# Patient Record
Sex: MALE | Race: WHITE | Employment: OTHER | URBAN - METROPOLITAN AREA
[De-identification: names, ages, dates, MRNs, and addresses within clinical notes are randomized per-mention and may not be internally consistent; named-entity substitution may affect disease eponyms.]

---

## 2020-07-17 ENCOUNTER — HOSPITAL ENCOUNTER (EMERGENCY)
Age: 60
Discharge: HOME | End: 2020-07-17
Attending: EMERGENCY MEDICINE
Payer: COMMERCIAL

## 2020-07-17 ENCOUNTER — APPOINTMENT (OUTPATIENT)
Dept: GENERAL RADIOLOGY | Age: 60
End: 2020-07-17
Payer: COMMERCIAL

## 2020-07-17 ENCOUNTER — APPOINTMENT (OUTPATIENT)
Dept: MRI IMAGING | Age: 60
End: 2020-07-17
Payer: COMMERCIAL

## 2020-07-17 VITALS
BODY MASS INDEX: 33.18 KG/M2 | HEIGHT: 72 IN | SYSTOLIC BLOOD PRESSURE: 135 MMHG | OXYGEN SATURATION: 96 % | DIASTOLIC BLOOD PRESSURE: 85 MMHG | HEART RATE: 72 BPM | TEMPERATURE: 97.9 F | RESPIRATION RATE: 18 BRPM | WEIGHT: 245 LBS

## 2020-07-17 LAB
ANION GAP SERPL CALCULATED.3IONS-SCNC: 11 MMOL/L (ref 3–16)
BASOPHILS ABSOLUTE: 0.1 K/UL (ref 0–0.2)
BASOPHILS RELATIVE PERCENT: 0.8 %
BUN BLDV-MCNC: 8 MG/DL (ref 7–20)
C-REACTIVE PROTEIN: 17.5 MG/L (ref 0–5.1)
CALCIUM SERPL-MCNC: 9.3 MG/DL (ref 8.3–10.6)
CHLORIDE BLD-SCNC: 103 MMOL/L (ref 99–110)
CO2: 24 MMOL/L (ref 21–32)
CREAT SERPL-MCNC: 0.8 MG/DL (ref 0.8–1.3)
EOSINOPHILS ABSOLUTE: 0.2 K/UL (ref 0–0.6)
EOSINOPHILS RELATIVE PERCENT: 2.9 %
GFR AFRICAN AMERICAN: >60
GFR NON-AFRICAN AMERICAN: >60
GLUCOSE BLD-MCNC: 128 MG/DL (ref 70–99)
HCT VFR BLD CALC: 47.1 % (ref 40.5–52.5)
HEMOGLOBIN: 15.9 G/DL (ref 13.5–17.5)
LYMPHOCYTES ABSOLUTE: 1.7 K/UL (ref 1–5.1)
LYMPHOCYTES RELATIVE PERCENT: 22.2 %
MCH RBC QN AUTO: 31.1 PG (ref 26–34)
MCHC RBC AUTO-ENTMCNC: 33.7 G/DL (ref 31–36)
MCV RBC AUTO: 92.3 FL (ref 80–100)
MONOCYTES ABSOLUTE: 0.8 K/UL (ref 0–1.3)
MONOCYTES RELATIVE PERCENT: 9.8 %
NEUTROPHILS ABSOLUTE: 5 K/UL (ref 1.7–7.7)
NEUTROPHILS RELATIVE PERCENT: 64.3 %
PDW BLD-RTO: 12.9 % (ref 12.4–15.4)
PLATELET # BLD: 174 K/UL (ref 135–450)
PMV BLD AUTO: 8.1 FL (ref 5–10.5)
POTASSIUM REFLEX MAGNESIUM: 3.8 MMOL/L (ref 3.5–5.1)
RBC # BLD: 5.1 M/UL (ref 4.2–5.9)
SEDIMENTATION RATE, ERYTHROCYTE: 18 MM/HR (ref 0–20)
SODIUM BLD-SCNC: 138 MMOL/L (ref 136–145)
WBC # BLD: 7.9 K/UL (ref 4–11)

## 2020-07-17 PROCEDURE — 86140 C-REACTIVE PROTEIN: CPT

## 2020-07-17 PROCEDURE — 99283 EMERGENCY DEPT VISIT LOW MDM: CPT

## 2020-07-17 PROCEDURE — 85652 RBC SED RATE AUTOMATED: CPT

## 2020-07-17 PROCEDURE — 6370000000 HC RX 637 (ALT 250 FOR IP): Performed by: PHYSICIAN ASSISTANT

## 2020-07-17 PROCEDURE — 96374 THER/PROPH/DIAG INJ IV PUSH: CPT

## 2020-07-17 PROCEDURE — 80048 BASIC METABOLIC PNL TOTAL CA: CPT

## 2020-07-17 PROCEDURE — 73721 MRI JNT OF LWR EXTRE W/O DYE: CPT

## 2020-07-17 PROCEDURE — 85025 COMPLETE CBC W/AUTO DIFF WBC: CPT

## 2020-07-17 PROCEDURE — 6360000002 HC RX W HCPCS: Performed by: PHYSICIAN ASSISTANT

## 2020-07-17 PROCEDURE — 73502 X-RAY EXAM HIP UNI 2-3 VIEWS: CPT

## 2020-07-17 RX ORDER — LIDOCAINE 4 G/G
1 PATCH TOPICAL DAILY
Status: DISCONTINUED | OUTPATIENT
Start: 2020-07-17 | End: 2020-07-17 | Stop reason: HOSPADM

## 2020-07-17 RX ORDER — KETOROLAC TROMETHAMINE 30 MG/ML
15 INJECTION, SOLUTION INTRAMUSCULAR; INTRAVENOUS ONCE
Status: COMPLETED | OUTPATIENT
Start: 2020-07-17 | End: 2020-07-17

## 2020-07-17 RX ORDER — NAPROXEN 375 MG/1
375 TABLET ORAL 2 TIMES DAILY WITH MEALS
Qty: 28 TABLET | Refills: 0 | Status: SHIPPED | OUTPATIENT
Start: 2020-07-17 | End: 2020-07-31

## 2020-07-17 RX ADMIN — KETOROLAC TROMETHAMINE 15 MG: 30 INJECTION, SOLUTION INTRAMUSCULAR at 12:01

## 2020-07-17 ASSESSMENT — PAIN SCALES - GENERAL
PAINLEVEL_OUTOF10: 2
PAINLEVEL_OUTOF10: 2

## 2020-07-17 ASSESSMENT — PAIN DESCRIPTION - PAIN TYPE: TYPE: ACUTE PAIN

## 2020-07-17 ASSESSMENT — PAIN DESCRIPTION - LOCATION: LOCATION: HIP;LEG

## 2020-07-17 ASSESSMENT — PAIN DESCRIPTION - ORIENTATION: ORIENTATION: RIGHT

## 2020-07-17 ASSESSMENT — PAIN DESCRIPTION - FREQUENCY: FREQUENCY: INTERMITTENT

## 2020-07-17 ASSESSMENT — PAIN - FUNCTIONAL ASSESSMENT: PAIN_FUNCTIONAL_ASSESSMENT: PREVENTS OR INTERFERES SOME ACTIVE ACTIVITIES AND ADLS

## 2020-07-17 ASSESSMENT — PAIN DESCRIPTION - DESCRIPTORS: DESCRIPTORS: ACHING

## 2020-07-17 NOTE — ED TRIAGE NOTES
Patient is a 56year old male who presents to the ED from home with c/o right hip and leg pain that began a couple of days ago. Patient denies injury. Patient is alert and oriented with even and unlabored respirations.

## 2020-07-17 NOTE — ED PROVIDER NOTES
ED Attending Attestation Note     Date of evaluation: 7/17/2020    This patient was seen by the advance practice provider. I have seen and examined the patient, agree with the workup, evaluation, management and diagnosis. The care plan has been discussed. My assessment reveals a 49-year-old male presenting with right hip pain that is worse with walking. Patient has tenderness over the greater trochanter, and over the IT band, and has strength on flexion extension although it is limited by pain. He has good DP pulse, and intact sensation and strength in the foot. He denies any recent trauma or injury, and denies history of gout. There is no significant erythema or swelling. Shaylee Chandler MD  07/17/20 [id number]

## 2020-07-17 NOTE — ED PROVIDER NOTES
1 [facility]  EMERGENCY DEPARTMENT ENCOUNTER          PHYSICIAN ASSISTANT NOTE       *[facility] has declared a state of emergency regarding the [address] pandemic*    Date of evaluation: 7/17/2020    Chief Complaint     Leg Pain and Hip Pain      History of Present Illness     Chanda Estrada is a 56 y.o. male with a history of hyperlipidemia presents today with right hip and right leg pain. Patient states approximately 2 days ago he went for a long walk as part of his usual routine. That evening he noticed some soreness in his right hip. As the days progressed he started experiencing worsening soreness in the right hip. With any weightbearing activity he experiences debilitating pain in the right hip and buttocks with soreness and pain in his right thigh and right knee. As long as he sits and does not move the pain is not present, but with any movement the pain returns. He has been unable to bear weight for the past 24 hours given his discomfort. He denies any numbness of the leg. He denies any discoloration of the leg. No recent trauma to the leg or back. He has not taken medications for symptoms. Review of Systems     As documented in the HPI, otherwise all other systems were reviewed and were negative. Past Medical, Surgical, Family, and Social History     He has a past medical history of Hyperlipidemia. He has no past surgical history on file. His family history is not on file. He reports that he has never smoked. He has never used smokeless tobacco. He reports current alcohol use. He reports that he does not use drugs. Medications     Previous Medications    ATORVASTATIN (LIPITOR) 10 MG TABLET    Take 10 mg by mouth daily. Allergies     He has No Known Allergies. Physical Exam     INITIAL VITALS: BP: 133/83, Temp: 97.9 °F (36.6 °C), Pulse: 72, Resp: 18, SpO2: 97 %     General: Well-appearing well-nourished male    HEENT:  Normocephalic, atraumatic. Pupils equal, sclera white. Handling secretions without difficulty. Neck: No meningismus. Trachea midline    Pulmonary: Respirations even. Non labored. No tachypnea. Cardiac: Chest symmetrical and non-tender on palpation of chest wall. Abdomen:  Non-distended. Musculoskeletal: Point tenderness to minimal palpation to the right greater trochanter and surrounding hip area, but no obvious erythema or warmth. Pain significantly worse with logroll on the right. Patient able to minimally flex and extend the right hip with significant pain. Range of motion of the knee and ankle fully intact. Good distal pulses. No unilateral edema. Patient cannot bear weight or ambulate secondary to the pain. Neuro:  Alert and oriented x 3. CN II - XII grossly intact. Moves all extremities spontaneously. Vascular:  2+ peripheral pulses in bilateral upper and lower extremities      Skin:  Warm and well perfused without rashes or lesions    Psych:  Appropriate mood and affect        Diagnostic Results     EKG   Interpreted in conjunction with emergency department physician No att. providers found      RADIOLOGY:  MRI HIP RIGHT WO CONTRAST   Final Result      1. Trochanteric bursal inflammation and small amount of fluid measuring up to 5 7 m in length, 1.37 reason with an some adjacent marrow replacement affecting subcortical region of the greater trochanter which could be related to bone contusion or less    likely osteomyelitis if there is infection within the draped greater trochanteric bursa, which could be sampled via ultrasound or under fluoroscopic guidance to determine if this is an infected bursa. 2.  The right hip joint is not abnormal with the femoral head neck normal in appearance and mild joint space loss noted without joint effusion to indicate septic hip joint      3.  Some inflammation along the fascia adjacent to the greater trochanteric region and some inflammation of the radius medius tendon insertion without full-thickness tear. There is a calcific density measuring 12 x 8 mm just lateral to the greater    trochanteric bursa which may be from prior trauma or inflammation         XR HIP 2-3 VW W PELVIS RIGHT   Final Result      No acute bony abnormality right hip. If concern for acute injury splinting with follow-up recommended      Bony ossicle noted adjacent to the greater trochanter may relate to previous injury and/or hydroxyapatite deposition disease.           LABS:   Results for orders placed or performed during the hospital encounter of 07/17/20   Sedimentation Rate   Result Value Ref Range    Sed Rate 18 0 - 20 mm/Hr   CRP   Result Value Ref Range    CRP 17.5 (H) 0.0 - 5.1 mg/L   CBC auto differential   Result Value Ref Range    WBC 7.9 4.0 - 11.0 K/uL    RBC 5.10 4.20 - 5.90 M/uL    Hemoglobin 15.9 13.5 - 17.5 g/dL    Hematocrit 47.1 40.5 - 52.5 %    MCV 92.3 80.0 - 100.0 fL    MCH 31.1 26.0 - 34.0 pg    MCHC 33.7 31.0 - 36.0 g/dL    RDW 12.9 12.4 - 15.4 %    Platelets 568 536 - 992 K/uL    MPV 8.1 5.0 - 10.5 fL    Neutrophils % 64.3 %    Lymphocytes % 22.2 %    Monocytes % 9.8 %    Eosinophils % 2.9 %    Basophils % 0.8 %    Neutrophils Absolute 5.0 1.7 - 7.7 K/uL    Lymphocytes Absolute 1.7 1.0 - 5.1 K/uL    Monocytes Absolute 0.8 0.0 - 1.3 K/uL    Eosinophils Absolute 0.2 0.0 - 0.6 K/uL    Basophils Absolute 0.1 0.0 - 0.2 K/uL   Basic Metabolic Panel w/ Reflex to MG   Result Value Ref Range    Sodium 138 136 - 145 mmol/L    Potassium reflex Magnesium 3.8 3.5 - 5.1 mmol/L    Chloride 103 99 - 110 mmol/L    CO2 24 21 - 32 mmol/L    Anion Gap 11 3 - 16    Glucose 128 (H) 70 - 99 mg/dL    BUN 8 7 - 20 mg/dL    CREATININE 0.8 0.8 - 1.3 mg/dL    GFR Non-African American >60 >60    GFR African American >60 >60    Calcium 9.3 8.3 - 10.6 mg/dL       ED BEDSIDE ULTRASOUND:    RECENT VITALS:  BP: 135/85, Temp: 97.9 °F (36.6 °C), Pulse: 72, Resp: 18, SpO2: 96 %     Procedures         ED Course     Nursing Notes, Past Medical Hx, Past Surgical Hx, Social Hx, Allergies, and Family Hx were reviewed. The patient was given the following medications:  Orders Placed This Encounter   Medications    lidocaine 4 % external patch 1 patch    ketorolac (TORADOL) injection 15 mg    naproxen (NAPROSYN) 375 MG tablet     Sig: Take 1 tablet by mouth 2 times daily (with meals) for 14 days     Dispense:  28 tablet     Refill:  0       CONSULTS:  IP CONSULT TO [address] / ASSESSMENT / Cornell Sudha is a 56 y.o. male presents today with 72 hours of gradual worsening atraumatic right hip pain. Preceding factor seems to be patient going for his usual long walk. On examination patient is nonambulatory secondary to the severe pain in his hip. He has point tenderness of the right greater trochanter and a positive logroll but no overt signs of infection. Systemically he appears well with reassuring vitals and good distal pulses. Differential at this time includes septic right hip, septic bursitis, greater trochanteric bursitis noninfectious, hip flexor/external rotators/abductor strain. No significant leukocytosis. Mild elevation in CRP. ESR not elevated. X-ray showed possible calcium pyrophosphate deposition with a right greater trochanter which could be contributing factor. With IV Toradol patient endorses if improvement of his pain at rest, but still cannot bear weight secondary to the debility and pain. This therefore raises my suspicion for possible septic hip, atraumatic stress fracture, possibly avascular necrosis. Discussed case with orthopedic surgery who recommended MRI without contrast.  This is notable for inflammation of the right greater trochanteric bursa with no findings to suggest septic arthritis. On my reevaluation patient endorses improved pain was able to bear weight with the assistance of crutches.   He will follow-up with orthopedic surgery team next week and we will start him on a course of twice daily naproxen. Extensive discussion about return precautions provided and patient felt comfortable with the plan and relieved at his decrease in pain. This patient was also evaluated by the attending physician. All care plans were discussed and agreed upon. Clinical Impression     1.  Trochanteric bursitis of right hip        Disposition     PATIENT REFERRED TO:  Tomy Barrientos MD  [facility]  [phone]    Schedule an appointment as soon as possible for a visit         DISCHARGE MEDICATIONS:  New Prescriptions    NAPROXEN (NAPROSYN) 375 MG TABLET    Take 1 tablet by mouth 2 times daily (with meals) for 14 days       DISPOSITION Decision To Discharge 07/17/2020 04:18:18 PM        June Venegas PA-C  07/17/20 [id number]

## 2020-07-17 NOTE — ED NOTES
Pt presents to ER with right leg/hip pain since Wednesday. Has restricted his mobility and is non-weight bearing. Pt denies injury to right lower extremity.       [facility]  07/17/20 1022

## 2023-06-02 NOTE — ED NOTES
Discharge order received. Patient being DC home with family. All paperwork explained to patient. He verbalizes understanding and denies any questions. IV removed. All belongings sent with patient.       Teresita Gudino, NED  07/17/20 1016 none

## 2023-08-29 ENCOUNTER — TELEPHONE (OUTPATIENT)
Dept: BARIATRICS/WEIGHT MGMT | Age: 63
End: 2023-08-29

## 2023-08-30 ENCOUNTER — OFFICE VISIT (OUTPATIENT)
Dept: BARIATRICS/WEIGHT MGMT | Age: 63
End: 2023-08-30

## 2023-08-30 VITALS
DIASTOLIC BLOOD PRESSURE: 88 MMHG | BODY MASS INDEX: 35.73 KG/M2 | WEIGHT: 263.8 LBS | HEART RATE: 74 BPM | SYSTOLIC BLOOD PRESSURE: 138 MMHG | TEMPERATURE: 96.8 F | OXYGEN SATURATION: 96 % | HEIGHT: 72 IN

## 2023-08-30 DIAGNOSIS — E66.9 CLASS 2 OBESITY WITH BODY MASS INDEX (BMI) OF 35.0 TO 35.9 IN ADULT, UNSPECIFIED OBESITY TYPE, UNSPECIFIED WHETHER SERIOUS COMORBIDITY PRESENT: Primary | ICD-10-CM

## 2023-08-30 PROCEDURE — NBSRV NON-BILLABLE SERVICE: Performed by: FAMILY MEDICINE

## 2023-08-30 RX ORDER — LEVOTHYROXINE SODIUM 0.07 MG/1
75 TABLET ORAL DAILY
COMMUNITY